# Patient Record
Sex: FEMALE | Race: WHITE | Employment: STUDENT | URBAN - METROPOLITAN AREA
[De-identification: names, ages, dates, MRNs, and addresses within clinical notes are randomized per-mention and may not be internally consistent; named-entity substitution may affect disease eponyms.]

---
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
## 2024-03-12 ENCOUNTER — OFFICE VISIT (OUTPATIENT)
Dept: DERMATOLOGY CLINIC | Facility: CLINIC | Age: 19
End: 2024-03-12

## 2024-03-12 DIAGNOSIS — D49.2 NEOPLASM OF UNSPECIFIED BEHAVIOR OF BONE, SOFT TISSUE, AND SKIN: ICD-10-CM

## 2024-03-12 DIAGNOSIS — D22.9 MULTIPLE BENIGN NEVI: Primary | ICD-10-CM

## 2024-03-12 PROCEDURE — 88305 TISSUE EXAM BY PATHOLOGIST: CPT | Performed by: STUDENT IN AN ORGANIZED HEALTH CARE EDUCATION/TRAINING PROGRAM

## 2024-03-12 PROCEDURE — 11103 TANGNTL BX SKIN EA SEP/ADDL: CPT | Performed by: STUDENT IN AN ORGANIZED HEALTH CARE EDUCATION/TRAINING PROGRAM

## 2024-03-12 PROCEDURE — 11102 TANGNTL BX SKIN SINGLE LES: CPT | Performed by: STUDENT IN AN ORGANIZED HEALTH CARE EDUCATION/TRAINING PROGRAM

## 2024-03-12 PROCEDURE — 99203 OFFICE O/P NEW LOW 30 MIN: CPT | Performed by: STUDENT IN AN ORGANIZED HEALTH CARE EDUCATION/TRAINING PROGRAM

## 2024-03-12 NOTE — PROGRESS NOTES
New Patient    Referred by: No referring provider defined for this encounter.    CHIEF COMPLAINT: Lesion of concern     HISTORY OF PRESENT ILLNESS: Michelle Nance is a 18 year old female here for evaluation of lesion of concern.    1. Growth   Location: L upper back  Duration: Birth  Signs and symptoms: None  Current treatment: None  Past treatments: None    2. Growth   Location: R shoulder  Duration: Birth  Signs and symptoms: None  Current treatment: None  Past treatments: None    Personal Dermatologic History  History of skin cancer: No  History of  atypical moles: No    FAMILY HISTORY:  History of melanoma: No    Past Medical History  Past Medical History:   Diagnosis Date    SVT (supraventricular tachycardia) ([facility])        REVIEW OF SYSTEMS:  Constitutional: Denies fever, chills, unintentional weight loss.   Skin as per HPI    Medications  Current Outpatient Medications   Medication Sig Dispense Refill    Levonorgestrel-Ethinyl Estrad 0.1-20 MG-MCG Oral Tab Take 1 tablet by mouth daily. 84 tablet 5    Levonorgestrel-Ethinyl Estrad 0.1-20 MG-MCG Oral Tab Take 1 tablet by mouth daily. 28 tablet 0       PHYSICAL EXAM:  General: awake, alert, no acute distress  Neuropsych: appropriate mood and affect  Eyes: Sclerae anicteric, without conjunctival injection, eyelids unremarkable  Skin: Skin exam was performed today including the following: face, chest, abdomen back and upper extremities. Pertinent findings include:   - - trunk and extremities with numerous regular brown macules and papules  - Right shoulder and left upper back with brown papules    ASSESSMENT & PLAN:  Pathophysiology of diagnoses discussed with patient.  Therapeutic options reviewed. Risks, benefits, and alternatives discussed with patient. Instructions reviewed at length.    #Multiple benign nevi  - Reassured patient of benign nature of these lesions.   - Return for lesions that are new, growing, changing or symptomatic.   - Recommend daily  photoprotection with broad-spectrum sunscreen (SPF 30 daily with reapplication every 2 hours), avoidance of sun during peak hours, and sun protective clothing.   - Dermoscopy was used for physical examination of pigmented lesions during today's office visit.    #Neoplasm(s) of uncertain behavior of skin  - Shave biopsy performed today   - Will notify patient with results and arrange for appropriate definitive treatment, if indicated.      Shave of lesion to establish and confirm diagnosis:  Photo taken: Yes    Risks, benefits, alternatives and personnel required for shave biopsy reviewed with patient. Risks discussed include, but not limited to: pain, bleeding, infection, scar, reaction to anesthetic, and recurrence/need for further treatment.  Patient and physician agree as to site(s) to be biopsied. Patient verbalizes understanding and wishes to proceed.     Site(s) prepped with alcohol and anesthetized with 1% lidocaine with epinephrine.   Shave of lesion(s) performed to the level of the dermis. Specimen(s) from A. R shoulder B. L upper back   sent for pathology to r/o Nevus 50% ALCL and bandaging applied.   Written and verbal wound care instructions provided to patient, understanding verbalized.    Return to clinic: as needed or sooner if something concerning arises     Joni Lanza MD

## 2025-01-15 ENCOUNTER — OFFICE VISIT (OUTPATIENT)
Dept: DERMATOLOGY CLINIC | Facility: CLINIC | Age: 20
End: 2025-01-15

## 2025-01-15 DIAGNOSIS — B07.9 VERRUCA VULGARIS: Primary | ICD-10-CM

## 2025-01-15 PROCEDURE — 99213 OFFICE O/P EST LOW 20 MIN: CPT | Performed by: STUDENT IN AN ORGANIZED HEALTH CARE EDUCATION/TRAINING PROGRAM

## 2025-01-15 NOTE — PROGRESS NOTES
Established Patient    Referred by: No referring provider defined for this encounter.    CHIEF COMPLAINT: Lesion of concern     HISTORY OF PRESENT ILLNESS: Michelle Nance is a 19 year old female here for evaluation of lesion of concern.    1. Lesion that previously had Wart  Location: Rt ring finger  Duration: 3 months  Bleeding, growing, changing?: No  Scaly?:No    Itchy?:No    Current treatment: Used compound W 2 wks ago.   Past treatments: None      Personal Dermatologic History  History of skin cancer: No  History of  atypical moles: No    FAMILY HISTORY:  History of melanoma: No    Past Medical History  Past Medical History:    SVT (supraventricular tachycardia)       REVIEW OF SYSTEMS:  Constitutional: Denies fever, chills, unintentional weight loss.   Skin as per HPI    Medications  Current Outpatient Medications   Medication Sig Dispense Refill    Levonorgestrel-Ethinyl Estrad 0.1-20 MG-MCG Oral Tab Take 1 tablet by mouth daily. 84 tablet 5    Levonorgestrel-Ethinyl Estrad 0.1-20 MG-MCG Oral Tab Take 1 tablet by mouth daily. 28 tablet 0       PHYSICAL EXAM:  General: awake, alert, no acute distress  Neuropsych: appropriate mood and affect  Eyes: Sclerae anicteric, without conjunctival injection, eyelids unremarkable  Skin: Skin exam was performed today including the following: finger. Pertinent findings include:   - with normal skin lines    ASSESSMENT & PLAN:  Pathophysiology of diagnoses discussed with patient.  Therapeutic options reviewed. Risks, benefits, and alternatives discussed with patient. Instructions reviewed at length.    #Verruca vulgaris, resolves  - Ok to stop compound W     Return to clinic: as needed or sooner if something concerning arises     Joni Lanza MD

## 2025-07-23 ENCOUNTER — OFFICE VISIT (OUTPATIENT)
Dept: FAMILY MEDICINE CLINIC | Facility: CLINIC | Age: 20
End: 2025-07-23

## 2025-07-23 ENCOUNTER — LAB ENCOUNTER (OUTPATIENT)
Dept: LAB | Age: 20
End: 2025-07-23
Attending: FAMILY MEDICINE
Payer: COMMERCIAL

## 2025-07-23 VITALS
HEIGHT: 67 IN | TEMPERATURE: 99 F | WEIGHT: 134 LBS | BODY MASS INDEX: 21.03 KG/M2 | DIASTOLIC BLOOD PRESSURE: 77 MMHG | HEART RATE: 87 BPM | SYSTOLIC BLOOD PRESSURE: 109 MMHG

## 2025-07-23 DIAGNOSIS — Z00.00 WELL ADULT EXAM: ICD-10-CM

## 2025-07-23 DIAGNOSIS — I47.10 SVT (SUPRAVENTRICULAR TACHYCARDIA) (HCC): ICD-10-CM

## 2025-07-23 DIAGNOSIS — Z00.00 WELL ADULT EXAM: Primary | ICD-10-CM

## 2025-07-23 DIAGNOSIS — Z71.6 ENCOUNTER FOR TOBACCO USE CESSATION COUNSELING: ICD-10-CM

## 2025-07-23 DIAGNOSIS — Z23 NEED FOR HPV VACCINATION: ICD-10-CM

## 2025-07-23 DIAGNOSIS — Z23 NEED FOR TDAP VACCINATION: ICD-10-CM

## 2025-07-23 DIAGNOSIS — F41.8 MIXED ANXIETY DEPRESSIVE DISORDER: ICD-10-CM

## 2025-07-23 LAB
ALBUMIN SERPL-MCNC: 4.8 G/DL (ref 3.2–4.8)
ALBUMIN/GLOB SERPL: 2 {RATIO} (ref 1–2)
ALP LIVER SERPL-CCNC: 44 U/L (ref 52–144)
ALT SERPL-CCNC: 14 U/L (ref 10–49)
ANION GAP SERPL CALC-SCNC: 11 MMOL/L (ref 0–18)
AST SERPL-CCNC: 16 U/L (ref ?–34)
BASOPHILS # BLD AUTO: 0.08 X10(3) UL (ref 0–0.2)
BASOPHILS NFR BLD AUTO: 1.2 %
BILIRUB SERPL-MCNC: 0.5 MG/DL (ref 0.3–1.2)
BUN BLD-MCNC: 6 MG/DL (ref 9–23)
BUN/CREAT SERPL: 7.6 (ref 10–20)
CALCIUM BLD-MCNC: 9.2 MG/DL (ref 8.7–10.4)
CHLORIDE SERPL-SCNC: 106 MMOL/L (ref 98–112)
CHOLEST SERPL-MCNC: 160 MG/DL (ref ?–200)
CO2 SERPL-SCNC: 22 MMOL/L (ref 21–32)
CREAT BLD-MCNC: 0.79 MG/DL (ref 0.55–1.02)
DEPRECATED RDW RBC AUTO: 40.8 FL (ref 35.1–46.3)
EGFRCR SERPLBLD CKD-EPI 2021: 109 ML/MIN/1.73M2 (ref 60–?)
EOSINOPHIL # BLD AUTO: 0.03 X10(3) UL (ref 0–0.7)
EOSINOPHIL NFR BLD AUTO: 0.4 %
ERYTHROCYTE [DISTWIDTH] IN BLOOD BY AUTOMATED COUNT: 12 % (ref 11–15)
FASTING PATIENT LIPID ANSWER: NO
FASTING STATUS PATIENT QL REPORTED: NO
GLOBULIN PLAS-MCNC: 2.4 G/DL (ref 2–3.5)
GLUCOSE BLD-MCNC: 88 MG/DL (ref 70–99)
HCT VFR BLD AUTO: 40 % (ref 35–48)
HDLC SERPL-MCNC: 60 MG/DL (ref 40–59)
HGB BLD-MCNC: 12.9 G/DL (ref 12–16)
IMM GRANULOCYTES # BLD AUTO: 0.01 X10(3) UL (ref 0–1)
IMM GRANULOCYTES NFR BLD: 0.1 %
LDLC SERPL CALC-MCNC: 86 MG/DL (ref ?–100)
LYMPHOCYTES # BLD AUTO: 1.48 X10(3) UL (ref 1–4)
LYMPHOCYTES NFR BLD AUTO: 21.6 %
MCH RBC QN AUTO: 29.5 PG (ref 26–34)
MCHC RBC AUTO-ENTMCNC: 32.3 G/DL (ref 31–37)
MCV RBC AUTO: 91.3 FL (ref 80–100)
MONOCYTES # BLD AUTO: 0.43 X10(3) UL (ref 0.1–1)
MONOCYTES NFR BLD AUTO: 6.3 %
NEUTROPHILS # BLD AUTO: 4.81 X10 (3) UL (ref 1.5–7.7)
NEUTROPHILS # BLD AUTO: 4.81 X10(3) UL (ref 1.5–7.7)
NEUTROPHILS NFR BLD AUTO: 70.4 %
NONHDLC SERPL-MCNC: 100 MG/DL (ref ?–130)
OSMOLALITY SERPL CALC.SUM OF ELEC: 285 MOSM/KG (ref 275–295)
PLATELET # BLD AUTO: 268 10(3)UL (ref 150–450)
POTASSIUM SERPL-SCNC: 4.3 MMOL/L (ref 3.5–5.1)
PROT SERPL-MCNC: 7.2 G/DL (ref 5.7–8.2)
RBC # BLD AUTO: 4.38 X10(6)UL (ref 3.8–5.3)
SODIUM SERPL-SCNC: 139 MMOL/L (ref 136–145)
TRIGL SERPL-MCNC: 71 MG/DL (ref 30–149)
TSI SER-ACNC: 1.4 UIU/ML (ref 0.55–4.78)
VLDLC SERPL CALC-MCNC: 11 MG/DL (ref 0–30)
WBC # BLD AUTO: 6.8 X10(3) UL (ref 4–11)

## 2025-07-23 PROCEDURE — 85025 COMPLETE CBC W/AUTO DIFF WBC: CPT

## 2025-07-23 PROCEDURE — 80053 COMPREHEN METABOLIC PANEL: CPT

## 2025-07-23 PROCEDURE — 36415 COLL VENOUS BLD VENIPUNCTURE: CPT

## 2025-07-23 PROCEDURE — 84443 ASSAY THYROID STIM HORMONE: CPT

## 2025-07-23 PROCEDURE — 80061 LIPID PANEL: CPT

## 2025-07-23 NOTE — PROGRESS NOTES
HPI:    Patient ID: Michelle Nance is a 19 year old female.   Chief Complaint   Patient presents with    [facility] Adult     Sees gyne         History of Present Illness       HPI    Wt Readings from Last 6 Encounters:   07/23/25 134 lb (60.8 kg)   08/06/21 122 lb (55.3 kg) (46%, Z= -0.10)*   07/28/20 125 lb (56.7 kg) (57%, Z= 0.17)*   02/04/20 122 lb (55.3 kg) (53%, Z= 0.08)*   10/07/19 117 lb (53.1 kg) (45%, Z= -0.12)*   10/07/19 117 lb 3.2 oz (53.2 kg) (46%, Z= -0.11)*     * Growth percentiles are based on [facility] (Girls, 2-20 Years) data.     BP Readings from Last 3 Encounters:   07/23/25 109/77   08/06/21 117/82   07/28/20 92/70       New patient   Has not seen a physician for 5 yrs,  Sees gyne, on ocps.  Sexually active. Monogamous.    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    Has anxiety and depression  Never saw therapist/ meds.    Not too inclined to seeing a therapist  Close her father  Has friends for support as well.    Has 1 sister , 3 brothers      Review of Systems   Constitutional:  Negative for activity change, appetite change, chills, fatigue, fever and unexpected weight change.   HENT:  Negative for congestion, dental problem, drooling, ear discharge, ear pain, facial swelling, hearing loss, mouth sores, nosebleeds, postnasal drip, rhinorrhea, sinus pressure, sinus pain, sneezing, sore throat, tinnitus, trouble swallowing and voice change.    Eyes:  Negative for pain, discharge, redness and visual disturbance.   Respiratory:  Negative for cough, shortness of breath and wheezing.    Cardiovascular:  Negative for chest pain, palpitations and leg swelling.   Gastrointestinal:  Negative for abdominal pain, anal bleeding, blood in stool, constipation, diarrhea, nausea, rectal pain and vomiting.   Endocrine: Negative for cold intolerance, heat intolerance, polydipsia, polyphagia and polyuria.   Genitourinary:  Negative for decreased urine volume, difficulty urinating, dysuria,  flank pain, frequency, menstrual problem, pelvic pain, urgency, vaginal bleeding, vaginal discharge and vaginal pain.        Periods are regular     Musculoskeletal:  Negative for arthralgias, back pain and myalgias.   Skin:  Negative for rash.   Neurological:  Negative for dizziness, seizures, syncope, weakness, numbness and headaches.   Hematological:  Does not bruise/bleed easily.   Psychiatric/Behavioral:  Negative for behavioral problems, decreased concentration, self-injury, sleep disturbance and suicidal ideas. The patient is not nervous/anxious.        /77   Pulse 87   Temp 98.6 °F (37 °C) (Temporal)   Ht 5' 7\" (1.702 m)   Wt 134 lb (60.8 kg)   LMP 07/14/2025 (Approximate)   BMI 20.99 kg/m²     Past Medical History[1]  Past Surgical History[2]  Social History     Socioeconomic History    Marital status: Single     Spouse name: Not on file    Number of children: Not on file    Years of education: Not on file    Highest education level: Not on file   Occupational History    Occupation: real estate license   Tobacco Use    Smoking status: Never     Passive exposure: Current    Smokeless tobacco: Never   Vaping Use    Vaping status: Former    Substances: Nicotine, Flavoring    Devices: Disposable   Substance and Sexual Activity    Alcohol use: Never    Drug use: Not Currently     Types: Cannabis    Sexual activity: Yes     Partners: Male   Other Topics Concern     Service Not Asked    Blood Transfusions Not Asked    Caffeine Concern Not Asked    Occupational Exposure Not Asked    Hobby Hazards Not Asked    Sleep Concern Not Asked    Stress Concern Not Asked    Weight Concern Not Asked    Special Diet Not Asked    Back Care Not Asked    Exercise Yes     Comment: yoga, dance    Bike Helmet Not Asked    Seat Belt Not Asked    Self-Exams Not Asked    Grew up on a farm Not Asked    History of tanning Yes    Outdoor occupation Not Asked    Breast feeding No    Reaction to local anesthetic No    Pt  has a pacemaker No    Pt has a defibrillator No   Social History Narrative    Not on file     Social Drivers of Health     Food Insecurity: Not on file   Transportation Needs: Not on file   Stress: Not on file   Housing Stability: Not on file     Family History[3]    Immunization History   Administered Date(s) Administered    DTAP 10/03/2003, 12/03/2003, 02/05/2004, 03/15/2005, 09/25/2008    HEP A,Ped/Adol,(2 Dose) 08/04/2014, 09/14/2016    HEP B 10/03/2003, 12/03/2003, 03/15/2005    HIB 10/03/2003, 12/03/2003, 03/15/2005    Hpv Virus Vaccine 9 Saniya Im 09/14/2016    IPV 02/05/2004, 07/28/2004, 03/15/2005, 09/25/2008    Influenza 12/06/2008    Influenza Vaccine, trivalent (IIV3), 0.5mL IM 10/25/2008, 09/05/2009, 01/09/2013    Intranasal (CPT=90660), Influenza Vaccine, Flu Clinic 10/23/2010    MMR 07/28/2004    MMR/Varicella Combined 08/27/2008    Meningococcal-Menactra 08/04/2014    Pneumococcal (Prevnar 7) 10/03/2003, 02/05/2004, 03/15/2005    TDAP 08/04/2014    Varicella 07/28/2004       Health Maintenance   Topic Date Due    Annual Physical  Never done    HPV Vaccines (2 - 2-dose series) 03/14/2017    Meningococcal B Vaccine (1 of 2 - Standard) Never done    Chlamydia Screening  08/06/2022    Pap Smear  Never done    DTaP,Tdap,and Td Vaccines (7 - Td or Tdap) 08/04/2024    COVID-19 Vaccine (1 - 2024-25 season) Never done    Annual Depression Screening  01/01/2025    Tobacco Cessation Counseling  Never done    Influenza Vaccine (1) 10/01/2025    Hepatitis B Vaccines  Completed    Hepatitis A Vaccines  Completed    MMR Vaccines  Completed    Varicella Vaccines  Completed    Pneumococcal Vaccine: Birth to 50yrs  Aged Out    Meningococcal Vaccine  Aged Out        Current Medications[4]  Allergies:Allergies[5]   PHYSICAL EXAM:     Chief Complaint   Patient presents with    [facility]    Well Adult     Sees gyne         Physical Exam  Vitals and nursing note reviewed.   Constitutional:       Appearance: She is  well-developed.   HENT:      Head: Normocephalic and atraumatic.      Right Ear: External ear normal.      Left Ear: External ear normal.      Nose: Nose normal.      Mouth/Throat:      Pharynx: No oropharyngeal exudate.   Eyes:      General:         Right eye: No discharge.         Left eye: No discharge.      Conjunctiva/sclera: Conjunctivae normal.      Pupils: Pupils are equal, round, and reactive to light.   Neck:      Thyroid: No thyromegaly.   Cardiovascular:      Rate and Rhythm: Normal rate and regular rhythm.      Heart sounds: Normal heart sounds. No murmur heard.  Pulmonary:      Effort: Pulmonary effort is normal.      Breath sounds: Normal breath sounds. No wheezing.   Abdominal:      General: Bowel sounds are normal.      Palpations: Abdomen is soft. There is no mass.      Tenderness: There is no abdominal tenderness.   Musculoskeletal:         General: No tenderness.      Cervical back: Normal range of motion and neck supple.   Lymphadenopathy:      Cervical: No cervical adenopathy.   Skin:     General: Skin is dry.      Findings: No rash.   Neurological:      Mental Status: She is alert and oriented to person, place, and time.      Cranial Nerves: No cranial nerve deficit.      Motor: No abnormal muscle tone.      Coordination: Coordination normal.      Deep Tendon Reflexes: Reflexes are normal and symmetric. Reflexes normal.   Psychiatric:         Behavior: Behavior normal.         Thought Content: Thought content normal.         Judgment: Judgment normal.                ASSESSMENT/PLAN:     Return yearly for physicals  Follow up with dentist every 6 months  Follow up with eye doctor yearly  Recommend aerobic exercise for at least 30mins 5 days a week  Yearly flu shot  Tetanus booster every 10 years (Tdap/ Td)  Labs ordered/ or reviewed if done prior to appointment     1. Well adult exam    - CBC With Differential With Platelet; Future  - Comp Metabolic Panel (14); Future  - Lipid Panel; Future  -  TSH W Reflex To Free T4; Future    2. Encounter for tobacco use cessation counseling  Quit vaping    3. Need for HPV vaccination  Second dose today  - HPV HUMAN PAPILLOMA VIRUS VACC 9 VALERIA 3 DOSE IM    4. Need for Tdap vaccination    - TETANUS, DIPHTHERIA TOXOIDS AND ACELLULAR PERTUSIS VACCINE (TDAP), >7 YEARS, IM USE    5. Hx of svt  Follow up cardiology  Still feels hear racing with minimal activities       6. Mixed anxiety and depressive symptoms  Declines seeing therapist/ treatment at this time   Denies any self harm thoughts     PATIENT LATER AGREED TO [facility]   REFERRAL PLACED     Assessment & Plan         Orders Placed This Encounter   Procedures    CBC With Differential With Platelet    Comp Metabolic Panel (14)    Lipid Panel    TSH W Reflex To Free T4    HPV HUMAN PAPILLOMA VIRUS VACC 9 VALERIA 3 DOSE IM    TETANUS, DIPHTHERIA TOXOIDS AND ACELLULAR PERTUSIS VACCINE (TDAP), >7 YEARS, IM USE       The above note was creating using Dragon speech recognition technology. Please excuse any typos    Meds This Visit:  Requested Prescriptions      No prescriptions requested or ordered in this encounter       Imaging & Referrals:  HPV HUMAN PAPILLOMA VIRUS VACC 9 VALERIA 3 DOSE IM  TETANUS, DIPHTHERIA TOXOIDS AND ACELLULAR PERTUSIS VACCINE (TDAP), >7 YEARS, IM USE  [facility] CARDIOLOGY EXTERNAL       ID#[id number]         [1]   Past Medical History:   SVT (supraventricular tachycardia) (HCC)   [2]   Past Surgical History:  Procedure Laterality Date    Other surgical history      cardiac ablation   [3]   Family History  Problem Relation Age of Onset    Arthritis Father         psoriatic arthritis    High Cholesterol Father     Heart Disease Father         ablation    No Known Problems Mother     Cancer Maternal Grandmother     Breast Cancer Maternal Grandmother     No Known Problems Maternal Grandfather     Heart Disease Paternal Grandmother     Heart Disease Paternal Grandfather     No Known Problems Sister     No Known  Problems Brother     No Known Problems Brother     No Known Problems Brother     Cancer Other         breast mggm    Other (migraine) Other         mat aunt    Asthma Neg     Birth Defects Neg     Diabetes Neg     High Blood Pressure Neg     Kidney Disease Neg     Learning Disability Neg     Stroke Neg     Substance Abuse Neg    [4]   Current Outpatient Medications   Medication Sig Dispense Refill    Levonorgestrel-Ethinyl Estrad 0.1-20 MG-MCG Oral Tab Take 1 tablet by mouth daily. 84 tablet 5    Levonorgestrel-Ethinyl Estrad 0.1-20 MG-MCG Oral Tab Take 1 tablet by mouth daily. 28 tablet 0   [5] No Known Allergies

## 2025-08-20 ENCOUNTER — OFFICE VISIT (OUTPATIENT)
Dept: DERMATOLOGY CLINIC | Facility: CLINIC | Age: 20
End: 2025-08-20

## 2025-08-20 DIAGNOSIS — R21 RASH AND NONSPECIFIC SKIN ERUPTION: Primary | ICD-10-CM

## 2025-08-20 PROCEDURE — 99214 OFFICE O/P EST MOD 30 MIN: CPT | Performed by: STUDENT IN AN ORGANIZED HEALTH CARE EDUCATION/TRAINING PROGRAM

## 2025-08-20 RX ORDER — TRIAMCINOLONE ACETONIDE 1 MG/G
CREAM TOPICAL
Qty: 80 G | Refills: 3 | Status: SHIPPED | OUTPATIENT
Start: 2025-08-20

## 2025-08-20 RX ORDER — AMOXICILLIN 500 MG/1
500 TABLET, FILM COATED ORAL EVERY 8 HOURS
COMMUNITY
Start: 2025-08-13

## (undated) NOTE — ED AVS SNAPSHOT
Aileen Mercado   MRN: [id number]    Department:  [facility] Emergency Department   Date of Visit:  10/7/2019           Disclosure     Insurance plans vary and the physician(s) referred by the ER may not be covered by your plan.  Please contac CARE PHYSICIAN AT ONCE OR RETURN IMMEDIATELY TO THE EMERGENCY DEPARTMENT. If you have been prescribed any medication(s), please fill your prescription right away and begin taking the medication(s) as directed.   If you believe that any of the medications

## (undated) NOTE — LETTER
Date & Time: 10/7/2019, 7:58 PM  Patient: Olga Rose  Encounter Provider(s):    Rayray Moise MD       To Whom It May Concern:    Dale Preciado was seen and treated in our department on 10/7/2019. She can return to school October 10,2019.

## (undated) NOTE — ED AVS SNAPSHOT
[facility] Emergency Department    [address] [place] [address]    Phone:  [phone]    Fax:  [phone]           Olga Rose   MRN: [id number]    Department:  [facility] Emergency Department   Date of Visit:  2/ please call our  at [phone]. Si tiene problemas para programar lisette ginger de seguimiento según lo indicado, llame al encargado de mark al [phone].     It is our goal to assure that you are completely satisfied with every aspect o doctor until you can check with your doctor. Please bring the medication list to your next doctor's appointment. Any imaging studies and labs completed today can be reviewed in your [employer] account.   You may have had testing done that requires us to co

## (undated) NOTE — ED AVS SNAPSHOT
[facility] Emergency Department    [address]     [address] [place] [address]    Phone:  [phone]    Fax:  [phone]           Chan Burlesonkell   MRN: [id number]    Department:  [facility] Emergency Department   Date of Visit:  2/ and Class Registration line at [phone] or find a doctor online by visiting [email or web address].    IF THERE IS ANY CHANGE OR WORSENING OF YOUR CONDITION, CALL YOUR PRIMARY CARE PHYSICIAN AT ONCE OR RETURN IMMEDIATELY TO [address] [place] [address].     If

## (undated) NOTE — LETTER
Date & Time: 10/7/2019, 7:56 PM  Patient: Malini Ramos  Encounter Provider(s):    Silver Marroquin MD       To Whom It May Concern:    Mazin Cantu was seen and treated in our department on 10/7/2019. She can return to [facility] September 10,2019.